# Patient Record
Sex: FEMALE
[De-identification: names, ages, dates, MRNs, and addresses within clinical notes are randomized per-mention and may not be internally consistent; named-entity substitution may affect disease eponyms.]

---

## 2023-07-31 ENCOUNTER — APPOINTMENT (OUTPATIENT)
Dept: PODIATRY | Facility: CLINIC | Age: 68
End: 2023-07-31
Payer: MEDICARE

## 2023-07-31 DIAGNOSIS — M79.674 PAIN IN RIGHT TOE(S): ICD-10-CM

## 2023-07-31 DIAGNOSIS — E11.49 TYPE 2 DIABETES MELLITUS WITH OTHER DIABETIC NEUROLOGICAL COMPLICATION: ICD-10-CM

## 2023-07-31 DIAGNOSIS — B07.0 PLANTAR WART: ICD-10-CM

## 2023-07-31 PROCEDURE — 17110 DESTRUCTION B9 LES UP TO 14: CPT | Mod: LT

## 2023-07-31 PROCEDURE — 99203 OFFICE O/P NEW LOW 30 MIN: CPT | Mod: 25

## 2023-08-08 PROBLEM — E11.49 DM (DIABETES MELLITUS), TYPE 2 WITH NEUROLOGICAL COMPLICATIONS: Status: ACTIVE | Noted: 2023-08-01

## 2023-08-08 PROBLEM — M79.674 PAIN OF TOE OF RIGHT FOOT: Status: ACTIVE | Noted: 2023-08-01

## 2023-08-08 PROBLEM — B07.0 PLANTAR WART OF RIGHT FOOT: Status: ACTIVE | Noted: 2023-08-01

## 2023-08-08 NOTE — ASSESSMENT
[FreeTextEntry1] :   Treatment: The patient consented. I prepped the area with alcohol. I opened up a debridement tray and attempted full thickness debridement of the wart. I touched it with TCA/chemocautery. It looks like it is eradicated. I put a sterile dressing on that she will leave on until tomorrow. She will inspect it daily. Follow-up in the office only as needed.

## 2023-08-08 NOTE — HISTORY OF PRESENT ILLNESS
[Sneakers] : lamar [FreeTextEntry1] : Patient presents today because of wart at the right sub 2nd ray, 2nd toe base. *Jennifer translates the entire encounter. Patient is diabetic. Fasting sugar is 120.